# Patient Record
Sex: MALE | Race: OTHER | NOT HISPANIC OR LATINO | Employment: FULL TIME | URBAN - METROPOLITAN AREA
[De-identification: names, ages, dates, MRNs, and addresses within clinical notes are randomized per-mention and may not be internally consistent; named-entity substitution may affect disease eponyms.]

---

## 2022-07-22 ENCOUNTER — TELEPHONE (OUTPATIENT)
Dept: UROLOGY | Facility: CLINIC | Age: 37
End: 2022-07-22
Payer: COMMERCIAL

## 2022-07-22 NOTE — TELEPHONE ENCOUNTER
Attempted to call pt x2 regarding appt scheduled incorrectly appt reschedueld with NP to est   Left vm to call back

## 2022-09-13 ENCOUNTER — OFFICE VISIT (OUTPATIENT)
Dept: UROLOGY | Facility: CLINIC | Age: 37
End: 2022-09-13
Payer: COMMERCIAL

## 2022-09-13 VITALS
RESPIRATION RATE: 18 BRPM | WEIGHT: 295 LBS | HEART RATE: 67 BPM | SYSTOLIC BLOOD PRESSURE: 147 MMHG | HEIGHT: 74 IN | BODY MASS INDEX: 37.86 KG/M2 | DIASTOLIC BLOOD PRESSURE: 99 MMHG

## 2022-09-13 DIAGNOSIS — R39.12 WEAK URINARY STREAM: Primary | ICD-10-CM

## 2022-09-13 LAB
BILIRUB SERPL-MCNC: NORMAL MG/DL
BLOOD URINE, POC: NORMAL
CLARITY, POC UA: CLEAR
COLOR, POC UA: YELLOW
GLUCOSE UR QL STRIP: NORMAL
KETONES UR QL STRIP: NORMAL
LEUKOCYTE ESTERASE URINE, POC: NORMAL
NITRITE, POC UA: NORMAL
PH, POC UA: 6.5
POC RESIDUAL URINE VOLUME: 33 ML (ref 0–100)
PROTEIN, POC: NORMAL
SPECIFIC GRAVITY, POC UA: 1.02
UROBILINOGEN, POC UA: 0.2

## 2022-09-13 PROCEDURE — 1160F PR REVIEW ALL MEDS BY PRESCRIBER/CLIN PHARMACIST DOCUMENTED: ICD-10-PCS | Mod: CPTII,S$GLB,, | Performed by: UROLOGY

## 2022-09-13 PROCEDURE — 3080F DIAST BP >= 90 MM HG: CPT | Mod: CPTII,S$GLB,, | Performed by: UROLOGY

## 2022-09-13 PROCEDURE — 81002 URINALYSIS NONAUTO W/O SCOPE: CPT | Mod: S$GLB,,, | Performed by: UROLOGY

## 2022-09-13 PROCEDURE — 3080F PR MOST RECENT DIASTOLIC BLOOD PRESSURE >= 90 MM HG: ICD-10-PCS | Mod: CPTII,S$GLB,, | Performed by: UROLOGY

## 2022-09-13 PROCEDURE — 51798 POCT BLADDER SCAN: ICD-10-PCS | Mod: S$GLB,,, | Performed by: UROLOGY

## 2022-09-13 PROCEDURE — 3077F SYST BP >= 140 MM HG: CPT | Mod: CPTII,S$GLB,, | Performed by: UROLOGY

## 2022-09-13 PROCEDURE — 51798 US URINE CAPACITY MEASURE: CPT | Mod: S$GLB,,, | Performed by: UROLOGY

## 2022-09-13 PROCEDURE — 81002 POCT URINE DIPSTICK WITHOUT MICROSCOPE: ICD-10-PCS | Mod: S$GLB,,, | Performed by: UROLOGY

## 2022-09-13 PROCEDURE — 3008F BODY MASS INDEX DOCD: CPT | Mod: CPTII,S$GLB,, | Performed by: UROLOGY

## 2022-09-13 PROCEDURE — 3008F PR BODY MASS INDEX (BMI) DOCUMENTED: ICD-10-PCS | Mod: CPTII,S$GLB,, | Performed by: UROLOGY

## 2022-09-13 PROCEDURE — 99999 PR PBB SHADOW E&M-EST. PATIENT-LVL III: CPT | Mod: PBBFAC,,, | Performed by: UROLOGY

## 2022-09-13 PROCEDURE — 1160F RVW MEDS BY RX/DR IN RCRD: CPT | Mod: CPTII,S$GLB,, | Performed by: UROLOGY

## 2022-09-13 PROCEDURE — 3077F PR MOST RECENT SYSTOLIC BLOOD PRESSURE >= 140 MM HG: ICD-10-PCS | Mod: CPTII,S$GLB,, | Performed by: UROLOGY

## 2022-09-13 PROCEDURE — 99204 OFFICE O/P NEW MOD 45 MIN: CPT | Mod: S$GLB,,, | Performed by: UROLOGY

## 2022-09-13 PROCEDURE — 99204 PR OFFICE/OUTPT VISIT, NEW, LEVL IV, 45-59 MIN: ICD-10-PCS | Mod: S$GLB,,, | Performed by: UROLOGY

## 2022-09-13 PROCEDURE — 99999 PR PBB SHADOW E&M-EST. PATIENT-LVL III: ICD-10-PCS | Mod: PBBFAC,,, | Performed by: UROLOGY

## 2022-09-13 PROCEDURE — 1159F MED LIST DOCD IN RCRD: CPT | Mod: CPTII,S$GLB,, | Performed by: UROLOGY

## 2022-09-13 PROCEDURE — 1159F PR MEDICATION LIST DOCUMENTED IN MEDICAL RECORD: ICD-10-PCS | Mod: CPTII,S$GLB,, | Performed by: UROLOGY

## 2022-09-13 RX ORDER — TAMSULOSIN HYDROCHLORIDE 0.4 MG/1
0.4 CAPSULE ORAL NIGHTLY
Qty: 30 CAPSULE | Refills: 11 | Status: SHIPPED | OUTPATIENT
Start: 2022-09-13 | End: 2022-12-14

## 2022-09-13 NOTE — PROGRESS NOTES
"[facility] Urology New Patient/H&P:     Stevenson Lyle is a 37 y.o. male who presents for evaluation of difficulty urinating    Has noted trouble starting stream and trouble emptying, and weak stream  Has been going on a couple years. + PV dribble. Occ split streaming or spraying.  No history STI  No trauma, instrumentation, catheterization  No blood in urine. Nonsmoker.   Occ trouble starting stream. Occ nocturia waking 3-4x/night  Not significant urgency.  Mostly trouble starting stream and emptying - pretty much all the time  AUA SS:  25/6 (4: Emptying, frequency, intermittency, weak stream, straining; 3: Sleeping; 2: Urgency)  Denies perineal, perirectal, ejaculatory, testicular pain.  No hematuria.  No dysuria.    Urinalysis dipstick negative.  Initial postvoid residual 143 cc.  When asked to void again, 2nd postvoid residual by bladder scan was 33 cc.  Patient did report that after initial void did not feel empty, and when asked to void again, was able to do a little bit more as above.    BP high lately with no diagnosis of HTN    History reviewed. No pertinent past medical history.    Past Surgical History:   Procedure Laterality Date    NECK SURGERY  2011    WISDOM TOOTH EXTRACTION         Family History   Problem Relation Age of Onset    No Known Problems Father     No Known Problems Mother        Social History     Socioeconomic History    Marital status: Single   Tobacco Use    Smoking status: Never    Smokeless tobacco: Never   Substance and Sexual Activity    Alcohol use: Yes     Comment: occ.    Drug use: Never    Sexual activity: Yes       Review of patient's allergies indicates:  No Known Allergies    Medications Reviewed: see MAR    Focused Physical Exam    Vitals:    09/13/22 1021   BP: (!) 147/99   Pulse: 67   Resp: 18     Body mass index is 37.88 kg/m². Weight: 133.8 kg (295 lb) Height: 6' 2" (188 cm)       Abdomen: Soft, non-tender, nondistended, no CVA tenderness  :  circ normal phallus without " plaques/lesions, orthotopic urethral meatus, bilaterally desc testes in normal scrotum without mass or lesion  RACHELLE: normal sphincter tone, no masses, no hemmorrhoids   PROSTATE: 15g, no nodules, non-tender, symmetrical. Non boggy      LABS:    Recent Results (from the past 336 hour(s))   POCT URINE DIPSTICK WITHOUT MICROSCOPE    Collection Time: 09/13/22 10:32 AM   Result Value Ref Range    Color, UA Yellow     pH, UA 6.5     WBC, UA neg     Nitrite, UA neg     Protein, POC neg     Glucose, UA neg     Ketones, UA neg     Urobilinogen, UA 0.2     Bilirubin, POC neg     Blood, UA neg     Clarity, UA Clear     Spec Grav UA 1.020    POCT Bladder Scan    Collection Time: 09/13/22 10:52 AM   Result Value Ref Range    POC Residual Urine Volume 33 0 - 100 mL         Assessment/Diagnosis:    1. Weak urinary stream  POCT URINE DIPSTICK WITHOUT MICROSCOPE    POCT Bladder Scan          Plans:  Extensively reviewed his obstructive LUTS and urinary concerns.  Did demonstrate some initial incomplete emptying, but on 2nd void was able to empty well.  We discussed the possible etiologies such as prostate enlargement/obstruction, urethral stricture, or pelvic floor dysfunction/dysfunctional voiding.  He is quite young for prostatic source contribution, and prostate is small on digital rectal exam, however there are anatomic variations of small prostate surgery contribute to obstruction and bothersome LUTS.  Certainly his symptoms are consistent with obstruction, though he has no does need risk factors for urethral stricture disease, we did review the obstruction at any point along the urinary channel could be at play.  We did review that cystoscopic evaluation would help rule in or out urethral stricture given any suspicion given age common symptom complex, but deferred at this time.  We reviewed medical management for obstructive LUTS with alpha-blocker as initial medical therapy, noting the only negative or down side in a patient  his age of fertility concerns given the potential for retrograde ejaculation.  He would prefer not to be on any medication or undergo any diagnostic procedures.  Certainly it is possible that there is a component of pelvic floor dysfunction, for which pelvic floor physical therapy would be beneficial, but this would be in the absence of any noted obstruction.  It was a bit unclear with the patient was hoping to gain from today's evaluation, as he seems resistant to all potential treatment pathways and diagnostics to help sort through the differential diagnosis.  After extensive discussion he ultimately elected to start alpha-blocker, Flomax 0.4 mg nightly, and return for re-evaluation with nurse practitioner in 3 months to no response.  If he is responding well, can continue and set routine follow-up.  Certainly if he has had no response, further evaluation for obstruction would be needed.  Prior to offering cystoscopy, could always do uroflow and see if there is any obstructed pattern or pattern consistent with stricture.  Certainly if there is any uroflow obstruction, would recommend cystoscopy.  However if unobstructed, and still symptomatic, may consider pelvic floor physical therapy evaluation.  Flomax Rx sent to pharmacy.  Three month NP visit set.  Conservative recommendations for urgency frequency provided.    Level of Medical Decision Making  [ _ ]  Low: 2 minor/1 stable chronic/1 acute uncomplicated --- review record/result/order test x2  [ X ]  Mod: 1 chronic exac/2stable chronic/1 acute comp --- review record/result/order test x3 OR independent interp of outside test OR discuss mgmt of outside ordered test --- start drug therapy/plan minor surgery   [ _ ]  High: chronic with exacerbation/progression or trtmnt side effect vs acute/chronic w/ life/body threat ---  (2/3) review record/result/order test x3 OR independent interp of ouutside test OR discuss mgmt of outside ordered test --- drug with monitoring  for toxicity, plan major surgery, hospitalize, deescalate/withdraw care bc of prognosis

## 2022-12-14 ENCOUNTER — OFFICE VISIT (OUTPATIENT)
Dept: UROLOGY | Facility: CLINIC | Age: 37
End: 2022-12-14
Payer: COMMERCIAL

## 2022-12-14 VITALS
HEIGHT: 74 IN | WEIGHT: 303.81 LBS | BODY MASS INDEX: 38.99 KG/M2 | HEART RATE: 72 BPM | SYSTOLIC BLOOD PRESSURE: 135 MMHG | DIASTOLIC BLOOD PRESSURE: 90 MMHG

## 2022-12-14 DIAGNOSIS — R39.12 WEAK URINARY STREAM: Primary | ICD-10-CM

## 2022-12-14 LAB
BILIRUBIN, UA POC OHS: NEGATIVE
BLOOD, UA POC OHS: NEGATIVE
CLARITY, UA POC OHS: CLEAR
COLOR, UA POC OHS: YELLOW
GLUCOSE, UA POC OHS: NEGATIVE
KETONES, UA POC OHS: NEGATIVE
LEUKOCYTES, UA POC OHS: NEGATIVE
NITRITE, UA POC OHS: NEGATIVE
PH, UA POC OHS: 5
POC RESIDUAL URINE VOLUME: 49 ML (ref 0–100)
PROTEIN, UA POC OHS: NEGATIVE
SPECIFIC GRAVITY, UA POC OHS: 1.01
UROBILINOGEN, UA POC OHS: 0.2

## 2022-12-14 PROCEDURE — 3075F SYST BP GE 130 - 139MM HG: CPT | Mod: CPTII,S$GLB,, | Performed by: NURSE PRACTITIONER

## 2022-12-14 PROCEDURE — 3008F PR BODY MASS INDEX (BMI) DOCUMENTED: ICD-10-PCS | Mod: CPTII,S$GLB,, | Performed by: NURSE PRACTITIONER

## 2022-12-14 PROCEDURE — 1160F RVW MEDS BY RX/DR IN RCRD: CPT | Mod: CPTII,S$GLB,, | Performed by: NURSE PRACTITIONER

## 2022-12-14 PROCEDURE — 1159F PR MEDICATION LIST DOCUMENTED IN MEDICAL RECORD: ICD-10-PCS | Mod: CPTII,S$GLB,, | Performed by: NURSE PRACTITIONER

## 2022-12-14 PROCEDURE — 81003 URINALYSIS AUTO W/O SCOPE: CPT | Mod: QW,S$GLB,, | Performed by: NURSE PRACTITIONER

## 2022-12-14 PROCEDURE — 1159F MED LIST DOCD IN RCRD: CPT | Mod: CPTII,S$GLB,, | Performed by: NURSE PRACTITIONER

## 2022-12-14 PROCEDURE — 3008F BODY MASS INDEX DOCD: CPT | Mod: CPTII,S$GLB,, | Performed by: NURSE PRACTITIONER

## 2022-12-14 PROCEDURE — 51798 POCT BLADDER SCAN: ICD-10-PCS | Mod: S$GLB,,, | Performed by: NURSE PRACTITIONER

## 2022-12-14 PROCEDURE — 3080F PR MOST RECENT DIASTOLIC BLOOD PRESSURE >= 90 MM HG: ICD-10-PCS | Mod: CPTII,S$GLB,, | Performed by: NURSE PRACTITIONER

## 2022-12-14 PROCEDURE — 1160F PR REVIEW ALL MEDS BY PRESCRIBER/CLIN PHARMACIST DOCUMENTED: ICD-10-PCS | Mod: CPTII,S$GLB,, | Performed by: NURSE PRACTITIONER

## 2022-12-14 PROCEDURE — 3080F DIAST BP >= 90 MM HG: CPT | Mod: CPTII,S$GLB,, | Performed by: NURSE PRACTITIONER

## 2022-12-14 PROCEDURE — 99999 PR PBB SHADOW E&M-EST. PATIENT-LVL III: CPT | Mod: PBBFAC,,, | Performed by: NURSE PRACTITIONER

## 2022-12-14 PROCEDURE — 3075F PR MOST RECENT SYSTOLIC BLOOD PRESS GE 130-139MM HG: ICD-10-PCS | Mod: CPTII,S$GLB,, | Performed by: NURSE PRACTITIONER

## 2022-12-14 PROCEDURE — 99999 PR PBB SHADOW E&M-EST. PATIENT-LVL III: ICD-10-PCS | Mod: PBBFAC,,, | Performed by: NURSE PRACTITIONER

## 2022-12-14 PROCEDURE — 51798 US URINE CAPACITY MEASURE: CPT | Mod: S$GLB,,, | Performed by: NURSE PRACTITIONER

## 2022-12-14 PROCEDURE — 99213 PR OFFICE/OUTPT VISIT, EST, LEVL III, 20-29 MIN: ICD-10-PCS | Mod: S$GLB,,, | Performed by: NURSE PRACTITIONER

## 2022-12-14 PROCEDURE — 81003 POCT URINALYSIS(INSTRUMENT): ICD-10-PCS | Mod: QW,S$GLB,, | Performed by: NURSE PRACTITIONER

## 2022-12-14 PROCEDURE — 99213 OFFICE O/P EST LOW 20 MIN: CPT | Mod: S$GLB,,, | Performed by: NURSE PRACTITIONER

## 2022-12-14 NOTE — Clinical Note
Hello, saw pt today for f/up.  He would like to proceed at this time with scheduling Cystoscopy for further evaluation of his LUTS.  Information was given and reviewed with pt today.  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 Thanks!

## 2022-12-14 NOTE — PROGRESS NOTES
[facility] Note  Staff: CHRIS Whitt-C    PCP: MD Diann  Urologist:  MD Philomena    Chief Complaint: F/UP-Weak urinary stream; Medication check.    Subjective:        HPI: Stevenson Lyle is a 37 y.o. male presents today for routine recheck.    Pt was evaluated once by Dr. Quach on 9/13/22 for weak urinary stream.  Has noted trouble starting stream and trouble emptying, and weak stream  Has been going on a couple years. + PV dribble. Occ split streaming or spraying.  No history STI  No trauma, instrumentation, catheterization  No blood in urine. Nonsmoker.   Occ trouble starting stream. Occ nocturia waking 3-4x/night  Not significant urgency.  Mostly trouble starting stream and emptying - pretty much all the time  AUA SS:  25/6 (4: Emptying, frequency, intermittency, weak stream, straining; 3: Sleeping; 2: Urgency)  Denies perineal, perirectal, ejaculatory, testicular pain.  No hematuria.  No dysuria.     Urinalysis dipstick negative.  Initial postvoid residual 143 cc.  When asked to void again, 2nd postvoid residual by bladder scan was 33 cc.  Patient did report that after initial void did not feel empty, and when asked to void again, was able to do a little bit more as above.     TODAY:  UA performed in office today showed normal findings.  PVR by bladder scan is 49 mL  Pt denies gross hematuria or dysuria at this time.     Pt states during ov today, when he began the Flomax after last office visit, his symptoms became worse therefore he stopped taking the medication at least 2 weeks ago.  Therefore his LUTS are the same compared to last office visit if not worse at this time.    REVIEW OF SYSTEMS:  A comprehensive 10 system review was performed and is negative except as noted above in HPI    PMHx:  History reviewed. No pertinent past medical history.    PSHx:  Past Surgical History:   Procedure Laterality Date    NECK SURGERY  2011    WISDOM TOOTH EXTRACTION       Allergies:  Patient  has no known allergies.    Medications: reviewed   Objective:     Vitals:    12/14/22 1137   BP: (!) 135/90   Pulse: 72     General:WDWN in NAD  Eyes: PERRLA, normal conjunctiva  Respiratory: no increased work on breathing, clear to auscultation  Cardiovascular: regular rate and rhythm. No obvious extremity edema.  GI: palpation of masses. No tenderness. No hepatosplenomegaly to palpation.  Musculoskeletal: normal range of motion of bilateral upper extremities. Normal muscle strength and tone.  Skin: no obvious rashes or lesions. No tightening of skin noted.  Neurologic: CN grossly normal. Normal sensation.   Psychiatric: awake, alert and oriented x 3. Mood and affect normal. Cooperative.     Exam performed by MD on 9/13/22:  Abdomen: Soft, non-tender, nondistended, no CVA tenderness  :  circ normal phallus without plaques/lesions, orthotopic urethral meatus, bilaterally desc testes in normal scrotum without mass or lesion  RACHELLE: normal sphincter tone, no masses, no hemmorrhoids   PROSTATE: 15g, no nodules, non-tender, symmetrical. Non boggy     Assessment:       1. Weak urinary stream          Plan:     I have thoroughly reviewed the following information to this pt during ov today:  *As reviewed, his current symptoms could be consistent with obstruction, and thus did review the obstruction at any point along the urinary channel could be involved.  We did review that cystoscopic evaluation would help rule in or out urethral stricture given any suspicion given age common symptom complex at this time.    Therefore pt has agreed for further f/up with Cystoscopy evaluation at this time.  Information on procedure was thoroughly reviewed with this pt during ov today.  All questions answered for patient.    Will now forward notes to MD and staff for scheduling at this time.  Pt verbalized understanding.      [place]ner: None    ANNE Almonte

## 2023-01-02 ENCOUNTER — TELEPHONE (OUTPATIENT)
Dept: UROLOGY | Facility: CLINIC | Age: 38
End: 2023-01-02

## 2023-01-02 DIAGNOSIS — R39.12 WEAK URINARY STREAM: Primary | ICD-10-CM

## 2023-01-03 NOTE — TELEPHONE ENCOUNTER
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  He would like to proceed at this time with scheduling Cystoscopy for further evaluation of his LUTS.  Information was given and reviewed with pt today.  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  Thanks!

## 2023-01-31 ENCOUNTER — HOSPITAL ENCOUNTER (OUTPATIENT)
Facility: AMBULARY SURGERY CENTER | Age: 38
Discharge: HOME | End: 2023-01-31
Attending: UROLOGY | Admitting: UROLOGY
Payer: COMMERCIAL

## 2023-01-31 DIAGNOSIS — R39.12 WEAK URINARY STREAM: ICD-10-CM

## 2023-01-31 PROCEDURE — 52000 PR CYSTOURETHROSCOPY: ICD-10-PCS | Mod: ,,, | Performed by: UROLOGY

## 2023-01-31 PROCEDURE — 52000 CYSTOURETHROSCOPY: CPT | Mod: ,,, | Performed by: UROLOGY

## 2023-01-31 PROCEDURE — 52000 CYSTOURETHROSCOPY: CPT | Performed by: UROLOGY

## 2023-01-31 RX ORDER — CIPROFLOXACIN 500 MG/1
500 TABLET ORAL 2 TIMES DAILY
Qty: 4 TABLET | Refills: 0 | Status: SHIPPED | OUTPATIENT
Start: 2023-01-31

## 2023-01-31 RX ORDER — LIDOCAINE HYDROCHLORIDE 20 MG/ML
JELLY TOPICAL
Status: DISCONTINUED | OUTPATIENT
Start: 2023-01-31 | End: 2023-01-31 | Stop reason: HOSPADM

## 2023-01-31 RX ORDER — WATER 1 ML/ML
IRRIGANT IRRIGATION
Status: DISCONTINUED | OUTPATIENT
Start: 2023-01-31 | End: 2023-01-31 | Stop reason: HOSPADM

## 2023-01-31 NOTE — H&P
"[facility] Urology New Patient/H&P:      Stevenson Lyle is a 37 y.o. male who presents for evaluation of difficulty urinating     Has noted trouble starting stream and trouble emptying, and weak stream  Has been going on a couple years. + PV dribble. Occ split streaming or spraying.  No history STI  No trauma, instrumentation, catheterization  No blood in urine. Nonsmoker.   Occ trouble starting stream. Occ nocturia waking 3-4x/night  Not significant urgency.  Mostly trouble starting stream and emptying - pretty much all the time  AUA SS:  25/6 (4: Emptying, frequency, intermittency, weak stream, straining; 3: Sleeping; 2: Urgency)  Denies perineal, perirectal, ejaculatory, testicular pain.  No hematuria.  No dysuria.     Urinalysis dipstick negative.  Initial postvoid residual 143 cc.  When asked to void again, 2nd postvoid residual by bladder scan was 33 cc.  Patient did report that after initial void did not feel empty, and when asked to void again, was able to do a little bit more as above.     BP high lately with no diagnosis of HTN     History reviewed. No pertinent past medical history.           Past Surgical History:   Procedure Laterality Date    NECK SURGERY   2011    WISDOM TOOTH EXTRACTION                   Family History   Problem Relation Age of Onset    No Known Problems Father      No Known Problems Mother           Social History               Socioeconomic History    Marital status: Single   Tobacco Use    Smoking status: Never    Smokeless tobacco: Never   Substance and Sexual Activity    Alcohol use: Yes       Comment: occ.    Drug use: Never    Sexual activity: Yes            Review of patient's allergies indicates:  No Known Allergies     Medications Reviewed: see MAR     Focused Physical Exam         Vitals:     09/13/22 1021   BP: (!) 147/99   Pulse: 67   Resp: 18      Body mass index is 37.88 kg/m². Weight: 133.8 kg (295 lb) Height: 6' 2" (188 cm)         Abdomen: Soft, non-tender, nondistended, no " CVA tenderness  :  circ normal phallus without plaques/lesions, orthotopic urethral meatus, bilaterally desc testes in normal scrotum without mass or lesion  RACHELLE: normal sphincter tone, no masses, no hemmorrhoids   PROSTATE: 15g, no nodules, non-tender, symmetrical. Non boggy        LABS:     Recent Results         Recent Results (from the past 336 hour(s))   POCT URINE DIPSTICK WITHOUT MICROSCOPE     Collection Time: 09/13/22 10:32 AM   Result Value Ref Range     Color, UA Yellow       pH, UA 6.5       WBC, UA neg       Nitrite, UA neg       Protein, POC neg       Glucose, UA neg       Ketones, UA neg       Urobilinogen, UA 0.2       Bilirubin, POC neg       Blood, UA neg       Clarity, UA Clear       Spec Grav UA 1.020     POCT Bladder Scan     Collection Time: 09/13/22 10:52 AM   Result Value Ref Range     POC Residual Urine Volume 33 0 - 100 mL               Assessment/Diagnosis:     1. Weak urinary stream  POCT URINE DIPSTICK WITHOUT MICROSCOPE     POCT Bladder Scan             Plans:  Extensively reviewed his obstructive LUTS and urinary concerns.  Did demonstrate some initial incomplete emptying, but on 2nd void was able to empty well.  We discussed the possible etiologies such as prostate enlargement/obstruction, urethral stricture, or pelvic floor dysfunction/dysfunctional voiding.  He is quite young for prostatic source contribution, and prostate is small on digital rectal exam, however there are anatomic variations of small prostate surgery contribute to obstruction and bothersome LUTS.  Certainly his symptoms are consistent with obstruction, though he has no does need risk factors for urethral stricture disease, we did review the obstruction at any point along the urinary channel could be at play.  We did review that cystoscopic evaluation would help rule in or out urethral stricture given any suspicion given age common symptom complex, but deferred at this time.  We reviewed medical management for  obstructive LUTS with alpha-blocker as initial medical therapy, noting the only negative or down side in a patient his age of fertility concerns given the potential for retrograde ejaculation.  He would prefer not to be on any medication or undergo any diagnostic procedures.  Certainly it is possible that there is a component of pelvic floor dysfunction, for which pelvic floor physical therapy would be beneficial, but this would be in the absence of any noted obstruction.  It was a bit unclear with the patient was hoping to gain from today's evaluation, as he seems resistant to all potential treatment pathways and diagnostics to help sort through the differential diagnosis.  After extensive discussion he ultimately elected to start alpha-blocker, Flomax 0.4 mg nightly, and return for re-evaluation with nurse practitioner in 3 months to no response.  If he is responding well, can continue and set routine follow-up.  Certainly if he has had no response, further evaluation for obstruction would be needed.  Prior to offering cystoscopy, could always do uroflow and see if there is any obstructed pattern or pattern consistent with stricture.  Certainly if there is any uroflow obstruction, would recommend cystoscopy.  However if unobstructed, and still symptomatic, may consider pelvic floor physical therapy evaluation.  Flomax Rx sent to pharmacy.  Three month NP visit set.  Conservative recommendations for urgency frequency provided.    12/14 np  UA performed in office today showed normal findings.  PVR by bladder scan is 49 mL  Pt denies gross hematuria or dysuria at this time.      Pt states during ov today, when he began the Flomax after last office visit, his symptoms became worse therefore he stopped taking the medication at least 2 weeks ago.  Therefore his LUTS are the same compared to last office visit if not worse at this time.    Elected cysto  Acceptable for asc

## 2023-02-01 ENCOUNTER — PATIENT MESSAGE (OUTPATIENT)
Dept: UROLOGY | Facility: CLINIC | Age: 38
End: 2023-02-01
Payer: COMMERCIAL

## 2023-02-02 VITALS
SYSTOLIC BLOOD PRESSURE: 162 MMHG | DIASTOLIC BLOOD PRESSURE: 98 MMHG | BODY MASS INDEX: 39 KG/M2 | TEMPERATURE: 98 F | HEART RATE: 81 BPM | RESPIRATION RATE: 18 BRPM | WEIGHT: 303.81 LBS | OXYGEN SATURATION: 99 %

## 2023-02-03 NOTE — OP NOTE
[facility] Urology Op Note     Date: 1/31/23     Staff Surgeon: Mehul Quach MD     Pre-Op Diagnosis:   BPH with LUTS     Post-Op Diagnosis: same     Procedure(s) Performed:   Cystoscopy, flexible    Specimen(s): none     Anesthesia: local, 2% xylocaine jelly urojet     Findings:   high bladder neck with early early proximal anterolateral lobe ingrowth without significant lateral lobe ingrowth or lateral lobe obstruction, though given significant bladder neck elevation does have obstructed prostatic urethra from contact of raised floor of prostate/bladder neck meeting anterolateral ingrowth of bilateral lateral lobes. Mildy trabeculated bladder.      Estimated Blood Loss: none     Drains: none     Complications: none     Indications for procedure:  Few year of significant obstructive LUTS, progressive, now AUA SS 25/6, with no risk factors for stricture. Subjective incomplete emptying with PVR 143cc but on prompted double void repeat PVR 33cc. No change with flomax, and presents today for repeat evaluation for obstruction.     Procedure in detail:  After informed consent, the patient was placed in supine position and prepped and drapped in standard cystoscopic fashion and 2% xylocaine jelly was instilled into the urethra     A flexible cystoscope was then passed into the bladder via the urethra.  Anterior urethra appeared normal without any lesions or narrowings. The prostatic urethra demonstrated visual obstruction as described above. Not distinctly obstructing centrally from lateral lobe contact, but rather the elevation of the bladder neck and floor of prostatic urethra meeting the anterolateral ingrowth of the lateral lobes in a tri-fold type obstruction     Bladder otherwise systematically inspected and no mucosal lesions or tumors seen. He did have some mild diffuse trabeculations. Bilateral ureteral orifices seen in orthotopic position on trigone bilaterally with clear efflux.     On retroflexion, no median  lobe or other abnormalities     Patient tolerated the procedure well. No complications     Disposition:  Though he does not have gross obstruction from his bilateral lateral lobe ingrowth, the anatomy of his prostate with his high bladder neck and ingrowth of floor of the prostate meeting the anterior lateral ingrowth of the lateral lobes causing complete obstruction in almost a Tri fold pattern, is leading to obstructive changes with bladder having evidence of chronic obstruction and likely yielding his obstructive symptoms, which are significant especially for his age. Given age <40 not candidate for minimally invasive bph interventions such as urolift though given anatomy with high bladder neck also not ideal anatomic candidate.  Had risk benefit of urolift TUIP versus TURP     Do not think that the lateral lobe retraction, even if anterior laterally placed, with Urolift would provide good result with unobstructed bladder neck given the high bladder neck.  Prostate anatomy and small size lend itself best to a transurethral incision of prostate and described this procedure in detail to the patient and compared and contrasted to transurethral resection of prostate, as well as the slightly lower risk, lower length of catheterization, and lower incidence of ejaculatory dysfunction approximately 10-20% verses %.  As well, given bladder neck elevation and need to take this down with trup increases risk of BNC.     Should he have symptomatic recurrence with TUIP or incomplete symptomatic resolution, TURP is still possible in the future, as would less invasive interventions.    He will consider discussion at this time and call to plan TUIP when resady.    Discharge home today status post uncomplicated procedure as above  Diet - resume home diet  Follow up: TUIP tbd  Instructions:   Drink plenty of water.  May see blood in urine.  complete 2d ppx abx  Meds:     Medication List        START taking these medications       ciprofloxacin HCl 500 MG tablet  Commonly known as: CIPRO  Take 1 tablet (500 mg total) by mouth 2 (two) times daily.               Where to Get Your Medications        These medications were sent to [id number] - [place] [id number] AT [facility] [address]  [address] [place] [address] [place] [id number]      Phone: [phone]   ciprofloxacin HCl 500 MG tablet

## 2023-02-20 ENCOUNTER — TELEPHONE (OUTPATIENT)
Dept: UROLOGY | Facility: CLINIC | Age: 38
End: 2023-02-20

## 2023-02-20 DIAGNOSIS — N13.8 BPH WITH OBSTRUCTION/LOWER URINARY TRACT SYMPTOMS: Primary | ICD-10-CM

## 2023-02-20 DIAGNOSIS — N40.1 BPH WITH OBSTRUCTION/LOWER URINARY TRACT SYMPTOMS: Primary | ICD-10-CM

## 2023-02-20 NOTE — TELEPHONE ENCOUNTER
----- Message from Melanie Faustin sent at 2/20/2023 12:54 PM CST -----  Appointment Request From: Stevenson Lyle    With Provider: Mehul Quach MD [[place] - Urology]    Preferred Date Range: Any    Preferred Times: Any Time    Reason for visit: Surgery    Comments:  I'm looking to schedule TUIP surgery not an office visit, surgery is my next option

## 2023-02-22 NOTE — TELEPHONE ENCOUNTER
Needs TUIP transurethral incision of prostate  See plan in last asc note    Can offer 3/9, 3/16, 3/30, 4/13 or beyond  PAT prior  [place] p/o protocols as with turp  Consent DOS    Case request placed no date - advise OR to pull from depot

## 2023-02-25 ENCOUNTER — PATIENT MESSAGE (OUTPATIENT)
Dept: UROLOGY | Facility: CLINIC | Age: 38
End: 2023-02-25
Payer: COMMERCIAL

## 2023-04-28 NOTE — TELEPHONE ENCOUNTER
After phone call to plan tuip in 2/23, sent message to pt which has never been read  At time of call was booked in for follow up to plan (despite offering date) and was set on 4/24 which he no-showed    Can mail details of this message and note from asc to pt, omitting surgery dates offered, and advise in letter to call or message office to plan TUIP as he has not returned calls, responded to portal message, or showed up for appointment    As discussed at ASC, can just call to plan procedure as noted. No o/v needed

## 2023-10-23 ENCOUNTER — TELEPHONE (OUTPATIENT)
Dept: UROLOGY | Facility: CLINIC | Age: 38
End: 2023-10-23
Payer: COMMERCIAL

## 2023-10-24 NOTE — TELEPHONE ENCOUNTER
Patient and provider have already discuss the procedure at length including anatomic findings and procedure in detail at time of cystoscopy on 1/31/23 as documented.    Patient noted he would call when ready to proceed.  He was initially active via [employer] in order to get a letter to excuse him from work    He then called in on 2/20/23 to schedule and I replied back with available options and placed case request orders - however the patient never again answered the phone, nor replied to or red [employer] messages, please see telephone encounter from 4/27/23 when letter was sent out.    As the patient made this appointment on his own via [employer], and still has not read previous messages, should be seen as scheduled so that he is not lost to follow-up again, as after he made contact to schedule the procedure, we made multiple attempts over the course of 2 months to get him scheduled.  Now that he has a visit, this is a good time to assist him in scheduling and can review with provider as scheduled    Date options in 2023: 12/7, 12/14, 12/21  Otherwise Thursdays into January  Arrangements can be made at visit  LUTS/emptying should be reevaluated with auass/pvr

## 2024-01-11 ENCOUNTER — TELEPHONE (OUTPATIENT)
Dept: UROLOGY | Facility: CLINIC | Age: 39
End: 2024-01-11
Payer: COMMERCIAL

## 2024-01-11 NOTE — TELEPHONE ENCOUNTER
Urology Telephone Encounter:    Please see Dr. Quach's previous encounter notes.  Pt is not to be rescheduled with our office therefore please cancel visit.    Needs to seek Urology care elsewhere.

## 2024-03-07 ENCOUNTER — OCCUPATIONAL HEALTH (OUTPATIENT)
Dept: URGENT CARE | Facility: CLINIC | Age: 39
End: 2024-03-07

## 2024-03-07 DIAGNOSIS — Z02.1 PRE-EMPLOYMENT EXAMINATION: Primary | ICD-10-CM

## 2024-03-07 DIAGNOSIS — Z02.83 ENCOUNTER FOR DRUG SCREENING: Primary | ICD-10-CM

## 2024-03-07 PROCEDURE — 97750 PHYSICAL PERFORMANCE TEST: CPT | Mod: S$GLB,,, | Performed by: FAMILY MEDICINE

## 2024-03-07 PROCEDURE — 99002 DEVICE HANDLING PHYS/QHP: CPT | Mod: S$GLB,,, | Performed by: FAMILY MEDICINE

## 2024-03-07 PROCEDURE — 82075 ASSAY OF BREATH ETHANOL: CPT | Mod: S$GLB,,, | Performed by: FAMILY MEDICINE

## 2024-03-07 PROCEDURE — 71045 X-RAY EXAM CHEST 1 VIEW: CPT | Mod: S$GLB,,, | Performed by: RADIOLOGY

## 2024-03-07 PROCEDURE — 99080 SPECIAL REPORTS OR FORMS: CPT | Mod: S$GLB,,, | Performed by: FAMILY MEDICINE

## 2024-03-07 PROCEDURE — 80305 DRUG TEST PRSMV DIR OPT OBS: CPT | Mod: S$GLB,,, | Performed by: FAMILY MEDICINE

## 2024-03-07 PROCEDURE — 94010 BREATHING CAPACITY TEST: CPT | Mod: S$GLB,,, | Performed by: FAMILY MEDICINE

## 2024-03-07 PROCEDURE — 72110 X-RAY EXAM L-2 SPINE 4/>VWS: CPT | Mod: S$GLB,,, | Performed by: RADIOLOGY

## 2024-03-25 LAB — BREATH ALCOHOL: 0

## 2025-05-05 ENCOUNTER — HOSPITAL ENCOUNTER (EMERGENCY)
Facility: HOSPITAL | Age: 40
Discharge: HOME | End: 2025-05-05
Attending: EMERGENCY MEDICINE
Payer: COMMERCIAL

## 2025-05-05 VITALS
HEART RATE: 100 BPM | WEIGHT: 275.81 LBS | OXYGEN SATURATION: 95 % | SYSTOLIC BLOOD PRESSURE: 159 MMHG | BODY MASS INDEX: 35.4 KG/M2 | TEMPERATURE: 99 F | RESPIRATION RATE: 20 BRPM | HEIGHT: 74 IN | DIASTOLIC BLOOD PRESSURE: 98 MMHG

## 2025-05-05 DIAGNOSIS — S00.83XA CONTUSION OF FACE, INITIAL ENCOUNTER: ICD-10-CM

## 2025-05-05 DIAGNOSIS — T14.90XA TRAUMA: ICD-10-CM

## 2025-05-05 DIAGNOSIS — S90.812A ABRASION OF LEFT FOOT, INITIAL ENCOUNTER: Primary | ICD-10-CM

## 2025-05-05 PROCEDURE — 70450 CT HEAD/BRAIN W/O DYE: CPT | Mod: 26,,, | Performed by: RADIOLOGY

## 2025-05-05 PROCEDURE — 90471 IMMUNIZATION ADMIN: CPT | Performed by: EMERGENCY MEDICINE

## 2025-05-05 PROCEDURE — 63600175 PHARM REV CODE 636 W HCPCS: Performed by: EMERGENCY MEDICINE

## 2025-05-05 PROCEDURE — 90715 TDAP VACCINE 7 YRS/> IM: CPT | Performed by: EMERGENCY MEDICINE

## 2025-05-05 PROCEDURE — 70450 CT HEAD/BRAIN W/O DYE: CPT | Mod: TC

## 2025-05-05 PROCEDURE — 73630 X-RAY EXAM OF FOOT: CPT | Mod: TC,LT

## 2025-05-05 PROCEDURE — 73630 X-RAY EXAM OF FOOT: CPT | Mod: 26,LT,, | Performed by: RADIOLOGY

## 2025-05-05 PROCEDURE — 99284 EMERGENCY DEPT VISIT MOD MDM: CPT | Mod: 25

## 2025-05-05 RX ADMIN — CLOSTRIDIUM TETANI TOXOID ANTIGEN (FORMALDEHYDE INACTIVATED), CORYNEBACTERIUM DIPHTHERIAE TOXOID ANTIGEN (FORMALDEHYDE INACTIVATED), BORDETELLA PERTUSSIS TOXOID ANTIGEN (GLUTARALDEHYDE INACTIVATED), BORDETELLA PERTUSSIS FILAMENTOUS HEMAGGLUTININ ANTIGEN (FORMALDEHYDE INACTIVATED), BORDETELLA PERTUSSIS PERTACTIN ANTIGEN, AND BORDETELLA PERTUSSIS FIMBRIAE 2/3 ANTIGEN 0.5 ML: 5; 2; 2.5; 5; 3; 5 INJECTION, SUSPENSION INTRAMUSCULAR at 08:05
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
## 2025-05-09 ENCOUNTER — OCCUPATIONAL HEALTH (OUTPATIENT)
Dept: URGENT CARE | Facility: CLINIC | Age: 40
End: 2025-05-09

## 2025-05-09 DIAGNOSIS — Z00.00 ENCOUNTER FOR PHYSICAL EXAMINATION: Primary | ICD-10-CM

## 2025-05-09 DIAGNOSIS — Z13.9 ENCOUNTER FOR SCREENING: Primary | ICD-10-CM

## 2025-05-09 LAB — BREATH ALCOHOL: 0

## (undated) DEVICE — GLOVE SURG ULTRA TOUCH 7

## (undated) DEVICE — SOL STERILE WATER INJ 1000ML

## (undated) DEVICE — SPONGE GAUZE 16PLY 4X4

## (undated) DEVICE — SCRUB 10% POVIDONE IODINE 4OZ

## (undated) DEVICE — SET CYSTO IRRIGATION UNIV SPIK